# Patient Record
Sex: FEMALE | Race: WHITE | Employment: UNEMPLOYED | URBAN - METROPOLITAN AREA
[De-identification: names, ages, dates, MRNs, and addresses within clinical notes are randomized per-mention and may not be internally consistent; named-entity substitution may affect disease eponyms.]

---
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
## 2021-04-16 ENCOUNTER — HOSPITAL ENCOUNTER (OUTPATIENT)
Dept: ULTRASOUND IMAGING | Age: 21
Discharge: HOME | End: 2021-04-18
Payer: COMMERCIAL

## 2021-04-16 ENCOUNTER — HOSPITAL ENCOUNTER (OUTPATIENT)
Age: 21
Discharge: HOME | End: 2021-04-16
Payer: COMMERCIAL

## 2021-04-16 DIAGNOSIS — N92.1 METRORRHAGIA: ICD-10-CM

## 2021-04-16 LAB
ABSOLUTE EOS #: 0.2 K/UL (ref 0–0.4)
ABSOLUTE IMMATURE GRANULOCYTE: ABNORMAL K/UL (ref 0–0.3)
ABSOLUTE LYMPH #: 2.9 K/UL (ref 1.2–5.2)
ABSOLUTE MONO #: 0.6 K/UL (ref 0.1–1.3)
BASOPHILS # BLD: 0 % (ref 0–2)
BASOPHILS ABSOLUTE: 0 K/UL (ref 0–0.2)
DIFFERENTIAL TYPE: ABNORMAL
EOSINOPHILS RELATIVE PERCENT: 1 % (ref 0–4)
ESTRADIOL LEVEL: 76 PG/ML (ref 27–314)
FOLLICLE STIMULATING HORMONE: 3.9 U/L (ref 1.7–21.5)
HCT VFR BLD CALC: 35.5 % (ref 36–46)
HEMOGLOBIN: 11.6 G/DL (ref 12–16)
IMMATURE GRANULOCYTES: ABNORMAL %
IRON SATURATION: 10 % (ref 20–55)
IRON: 35 UG/DL (ref 37–145)
LH: 4.2 U/L (ref 1–95.6)
LYMPHOCYTES # BLD: 26 % (ref 25–45)
MCH RBC QN AUTO: 27.5 PG (ref 26–34)
MCHC RBC AUTO-ENTMCNC: 32.7 G/DL (ref 31–37)
MCV RBC AUTO: 84.2 FL (ref 80–100)
MONOCYTES # BLD: 5 % (ref 2–8)
NRBC AUTOMATED: ABNORMAL PER 100 WBC
PDW BLD-RTO: 14.1 % (ref 11.5–14.9)
PLATELET # BLD: 326 K/UL (ref 150–450)
PLATELET ESTIMATE: ABNORMAL
PMV BLD AUTO: 8.4 FL (ref 6–12)
RBC # BLD: 4.22 M/UL (ref 4–5.2)
RBC # BLD: ABNORMAL 10*6/UL
SEG NEUTROPHILS: 68 % (ref 34–64)
SEGMENTED NEUTROPHILS ABSOLUTE COUNT: 7.3 K/UL (ref 1.3–9.1)
THYROXINE, FREE: 1.05 NG/DL (ref 0.93–1.7)
TOTAL IRON BINDING CAPACITY: 355 UG/DL (ref 250–450)
TSH SERPL DL<=0.05 MIU/L-ACNC: 7.64 MIU/L (ref 0.3–5)
UNSATURATED IRON BINDING CAPACITY: 320 UG/DL (ref 112–347)
WBC # BLD: 10.9 K/UL (ref 4.5–13.5)
WBC # BLD: ABNORMAL 10*3/UL

## 2021-04-16 PROCEDURE — 82670 ASSAY OF TOTAL ESTRADIOL: CPT

## 2021-04-16 PROCEDURE — 83002 ASSAY OF GONADOTROPIN (LH): CPT

## 2021-04-16 PROCEDURE — 84439 ASSAY OF FREE THYROXINE: CPT

## 2021-04-16 PROCEDURE — 83540 ASSAY OF IRON: CPT

## 2021-04-16 PROCEDURE — 76856 US EXAM PELVIC COMPLETE: CPT

## 2021-04-16 PROCEDURE — 85025 COMPLETE CBC W/AUTO DIFF WBC: CPT

## 2021-04-16 PROCEDURE — 83001 ASSAY OF GONADOTROPIN (FSH): CPT

## 2021-04-16 PROCEDURE — 83550 IRON BINDING TEST: CPT

## 2021-04-16 PROCEDURE — 36415 COLL VENOUS BLD VENIPUNCTURE: CPT

## 2021-04-16 PROCEDURE — 84443 ASSAY THYROID STIM HORMONE: CPT

## 2021-09-22 ENCOUNTER — HOSPITAL ENCOUNTER (OUTPATIENT)
Age: 21
Discharge: HOME | End: 2021-09-22
Payer: COMMERCIAL

## 2021-09-22 LAB
ABSOLUTE EOS #: 0.2 K/UL (ref 0–0.4)
ABSOLUTE IMMATURE GRANULOCYTE: NORMAL K/UL (ref 0–0.3)
ABSOLUTE LYMPH #: 3.5 K/UL (ref 1.2–5.2)
ABSOLUTE MONO #: 0.8 K/UL (ref 0.1–1.3)
BASOPHILS # BLD: 1 % (ref 0–2)
BASOPHILS ABSOLUTE: 0.1 K/UL (ref 0–0.2)
DIFFERENTIAL TYPE: NORMAL
EOSINOPHILS RELATIVE PERCENT: 2 % (ref 0–4)
HCT VFR BLD CALC: 37.9 % (ref 36–46)
HEMOGLOBIN: 12.5 G/DL (ref 12–16)
IMMATURE GRANULOCYTES: NORMAL %
IRON SATURATION: 12 % (ref 20–55)
IRON: 42 UG/DL (ref 37–145)
LYMPHOCYTES # BLD: 30 % (ref 25–45)
MCH RBC QN AUTO: 27.4 PG (ref 26–34)
MCHC RBC AUTO-ENTMCNC: 33 G/DL (ref 31–37)
MCV RBC AUTO: 82.9 FL (ref 80–100)
MONOCYTES # BLD: 7 % (ref 2–8)
NRBC AUTOMATED: NORMAL PER 100 WBC
PDW BLD-RTO: 14.5 % (ref 11.5–14.9)
PLATELET # BLD: 358 K/UL (ref 150–450)
PLATELET ESTIMATE: NORMAL
PMV BLD AUTO: 8 FL (ref 6–12)
RBC # BLD: 4.57 M/UL (ref 4–5.2)
RBC # BLD: NORMAL 10*6/UL
SEG NEUTROPHILS: 60 % (ref 34–64)
SEGMENTED NEUTROPHILS ABSOLUTE COUNT: 7.2 K/UL (ref 1.3–9.1)
TOTAL IRON BINDING CAPACITY: 346 UG/DL (ref 250–450)
TSH SERPL DL<=0.05 MIU/L-ACNC: 3.28 MIU/L (ref 0.3–5)
UNSATURATED IRON BINDING CAPACITY: 304 UG/DL (ref 112–347)
WBC # BLD: 11.7 K/UL (ref 4.5–13.5)
WBC # BLD: NORMAL 10*3/UL

## 2021-09-22 PROCEDURE — 83550 IRON BINDING TEST: CPT

## 2021-09-22 PROCEDURE — 84443 ASSAY THYROID STIM HORMONE: CPT

## 2021-09-22 PROCEDURE — 85025 COMPLETE CBC W/AUTO DIFF WBC: CPT

## 2021-09-22 PROCEDURE — 36415 COLL VENOUS BLD VENIPUNCTURE: CPT

## 2021-09-22 PROCEDURE — 83540 ASSAY OF IRON: CPT

## 2022-08-20 ENCOUNTER — HOSPITAL ENCOUNTER (OUTPATIENT)
Age: 22
Discharge: HOME | End: 2022-08-20
Payer: COMMERCIAL

## 2022-08-20 LAB — TSH SERPL DL<=0.05 MIU/L-ACNC: 4.22 UIU/ML (ref 0.3–5)

## 2022-08-20 PROCEDURE — 84443 ASSAY THYROID STIM HORMONE: CPT

## 2022-08-20 PROCEDURE — 36415 COLL VENOUS BLD VENIPUNCTURE: CPT

## 2023-09-15 ENCOUNTER — HOSPITAL ENCOUNTER (OUTPATIENT)
Age: 23
Setting detail: SPECIMEN
Discharge: HOME | End: 2023-09-15

## 2023-09-16 LAB — TSH SERPL DL<=0.05 MIU/L-ACNC: 5.84 UIU/ML (ref 0.3–5)

## 2024-05-20 ENCOUNTER — HOSPITAL ENCOUNTER (EMERGENCY)
Facility: CLINIC | Age: 24
Discharge: HOME | End: 2024-05-20
Attending: EMERGENCY MEDICINE
Payer: COMMERCIAL

## 2024-05-20 VITALS
WEIGHT: 293 LBS | DIASTOLIC BLOOD PRESSURE: 88 MMHG | SYSTOLIC BLOOD PRESSURE: 131 MMHG | TEMPERATURE: 97.9 F | HEIGHT: 64 IN | RESPIRATION RATE: 16 BRPM | BODY MASS INDEX: 50.02 KG/M2 | OXYGEN SATURATION: 98 % | HEART RATE: 78 BPM

## 2024-05-20 DIAGNOSIS — M79.601 RIGHT ARM PAIN: Primary | ICD-10-CM

## 2024-05-20 DIAGNOSIS — T14.8XXA MUSCLE STRAIN: ICD-10-CM

## 2024-05-20 PROCEDURE — 99284 EMERGENCY DEPT VISIT MOD MDM: CPT

## 2024-05-20 PROCEDURE — 96372 THER/PROPH/DIAG INJ SC/IM: CPT

## 2024-05-20 PROCEDURE — 6360000002 HC RX W HCPCS: Performed by: EMERGENCY MEDICINE

## 2024-05-20 RX ORDER — KETOROLAC TROMETHAMINE 15 MG/ML
15 INJECTION, SOLUTION INTRAMUSCULAR; INTRAVENOUS ONCE
Status: COMPLETED | OUTPATIENT
Start: 2024-05-20 | End: 2024-05-20

## 2024-05-20 RX ORDER — NORGESTIMATE AND ETHINYL ESTRADIOL 7DAYSX3 28
1 KIT ORAL DAILY
COMMUNITY
Start: 2023-06-05 | End: 2024-09-19

## 2024-05-20 RX ADMIN — KETOROLAC TROMETHAMINE 15 MG: 15 INJECTION, SOLUTION INTRAMUSCULAR; INTRAVENOUS at 12:12

## 2024-05-20 ASSESSMENT — ENCOUNTER SYMPTOMS
NAUSEA: 0
COUGH: 0
SORE THROAT: 0
ABDOMINAL PAIN: 0
DIARRHEA: 0
BACK PAIN: 0
VOMITING: 0
SHORTNESS OF BREATH: 0
WHEEZING: 0

## 2024-05-20 ASSESSMENT — PAIN SCALES - GENERAL
PAINLEVEL_OUTOF10: 7
PAINLEVEL_OUTOF10: 7

## 2024-05-20 ASSESSMENT — PAIN - FUNCTIONAL ASSESSMENT: PAIN_FUNCTIONAL_ASSESSMENT: 0-10

## 2024-05-20 ASSESSMENT — PAIN DESCRIPTION - ORIENTATION: ORIENTATION: RIGHT

## 2024-05-20 ASSESSMENT — PAIN DESCRIPTION - LOCATION: LOCATION: ARM

## 2024-05-20 NOTE — ED PROVIDER NOTES
[facility]  [address] [facility] [address]  Phone: [phone]    eMERGENCY dEPARTMENT eNCOUnter        Pt Name: Unique Escamilla  MRN: [id number]  Birthdate 2000  Date of evaluation: 5/20/24    CHIEF COMPLAINT     Chief Complaint   Patient presents with    Arm Pain     right       HISTORY OF PRESENT ILLNESS    Unique Escamilla is a 23 y.o. female who presents to the emergency department accompanied by her significant other with right arm pain.  Pay stated began yesterday evening which was relieved completely after she took some Tylenol.  Patient admits she is right-hand dominant was helping pack up her mother's items from a garage sale.  She stated she did a lot of heavy pushing pulling and lifting.  She is otherwise an obese white female that is not well fit for this activity.  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  She has not taken any additional over-the-counter remedies this morning.  She denies any weakness numbness or swelling in the arm.  It hurts with certain lifting movements and pushing on the area.  No bony tenderness.  She denies any fever chills cough congestion chest pain or shortness of breath.  No other injury or trauma.    REVIEW OF SYSTEMS     Review of Systems   Constitutional:  Negative for chills and fever.   HENT:  Negative for congestion, ear pain and sore throat.    Respiratory:  Negative for cough, shortness of breath and wheezing.    Cardiovascular:  Negative for chest pain, palpitations and leg swelling.   Gastrointestinal:  Negative for abdominal pain, diarrhea, nausea and vomiting.   Genitourinary:  Negative for dysuria, flank pain, frequency and hematuria.   Musculoskeletal:  Negative for back pain.   Skin:  Negative for rash.   Neurological:  Negative for dizziness, light-headedness, numbness and headaches.       PAST MEDICAL HISTORY    has a past medical history of ADHD and  I have answered their questions and given discharge instructions.  They voiced understanding of these instructions and did not have any further questions or complaints.               DIAGNOSTIC RESULTS     LABS:  No results found for this visit on 05/20/24.    All other labs were within normal range or not returned as of this dictation.    RADIOLOGY:  No orders to display       I have reviewed the disposition diagnosis with the patient and or their family/guardian.  I have answered their questions and givendischarge instructions.  They voiced understanding of these instructions and did not have any further questions or complaints.    PROCEDURES:  Unless otherwise noted below, none     Procedures    FINAL IMPRESSION      1. Right arm pain    2. Muscle strain          DISPOSITION/PLAN   DISPOSITION Decision To Discharge 05/20/2024 11:59:57 AM      PATIENT REFERRED TO:  Marcus Hastings MD  [address]  [facility] [address] [phone]    Call today        DISCHARGE MEDICATIONS:  Discharge Medication List as of 5/20/2024 12:15 PM             (Please note that portions of this note were completed with a voice recognition program.  Efforts were made to edit the dictations but occasionally words are mis-transcribed.)    Rhea Lezama DO,(electronically signed)  Board Certified Emergency Physician       Rhea Lezama DO  05/25/24 8111

## 2024-05-20 NOTE — DISCHARGE INSTRUCTIONS
Return to the emergency department if symptoms worsen or persist.  Follow-up with your family doctor 1 to 2 days.  Take ibuprofen as needed for pain over the next 3 days.

## 2024-05-20 NOTE — ED TRIAGE NOTES
Pt to ED for right arm pain that started last night. Pt reports she had a shooting pain, then took 1000mg of Tylenol and had relief from pain. Since she woke up this morning, pain is back. No OTC meds today. States pain is intermittent, states pain starts in fingers and ascends. Rates 7/10.

## 2024-11-05 ENCOUNTER — HOSPITAL ENCOUNTER (EMERGENCY)
Facility: CLINIC | Age: 24
Discharge: HOME | End: 2024-11-05
Attending: EMERGENCY MEDICINE
Payer: COMMERCIAL

## 2024-11-05 ENCOUNTER — APPOINTMENT (OUTPATIENT)
Dept: GENERAL RADIOLOGY | Facility: CLINIC | Age: 24
End: 2024-11-05
Attending: EMERGENCY MEDICINE
Payer: COMMERCIAL

## 2024-11-05 VITALS
OXYGEN SATURATION: 98 % | SYSTOLIC BLOOD PRESSURE: 147 MMHG | WEIGHT: 276.4 LBS | BODY MASS INDEX: 47.19 KG/M2 | HEIGHT: 64 IN | HEART RATE: 74 BPM | DIASTOLIC BLOOD PRESSURE: 88 MMHG | RESPIRATION RATE: 18 BRPM | TEMPERATURE: 97.8 F

## 2024-11-05 DIAGNOSIS — R07.89 CHEST WALL PAIN: Primary | ICD-10-CM

## 2024-11-05 LAB
ANION GAP SERPL CALCULATED.3IONS-SCNC: 13 MMOL/L (ref 9–17)
BASOPHILS # BLD: 0.1 K/UL (ref 0–0.2)
BASOPHILS NFR BLD: 1 % (ref 0–2)
BUN SERPL-MCNC: 9 MG/DL (ref 6–20)
CALCIUM SERPL-MCNC: 9.4 MG/DL (ref 8.6–10.4)
CHLORIDE SERPL-SCNC: 104 MMOL/L (ref 98–107)
CO2 SERPL-SCNC: 23 MMOL/L (ref 20–31)
CREAT SERPL-MCNC: 0.6 MG/DL (ref 0.5–0.9)
D DIMER PPP FEU-MCNC: 0.21 UG/ML FEU
EOSINOPHIL # BLD: 0.2 K/UL (ref 0–0.4)
EOSINOPHILS RELATIVE PERCENT: 2 % (ref 1–4)
ERYTHROCYTE [DISTWIDTH] IN BLOOD BY AUTOMATED COUNT: 13.5 % (ref 12.5–15.4)
GFR, ESTIMATED: >90 ML/MIN/1.73M2
GLUCOSE SERPL-MCNC: 88 MG/DL (ref 70–99)
HCG SERPL QL: NEGATIVE
HCT VFR BLD AUTO: 38.4 % (ref 36–46)
HGB BLD-MCNC: 12.8 G/DL (ref 12–16)
LYMPHOCYTES NFR BLD: 2.8 K/UL (ref 1–4.8)
LYMPHOCYTES RELATIVE PERCENT: 30 % (ref 24–44)
MCH RBC QN AUTO: 28.6 PG (ref 26–34)
MCHC RBC AUTO-ENTMCNC: 33.4 G/DL (ref 31–37)
MCV RBC AUTO: 85.7 FL (ref 80–100)
MONOCYTES NFR BLD: 0.5 K/UL (ref 0.1–1.2)
MONOCYTES NFR BLD: 5 % (ref 2–11)
NEUTROPHILS NFR BLD: 62 % (ref 36–66)
NEUTS SEG NFR BLD: 6 K/UL (ref 1.8–7.7)
PLATELET # BLD AUTO: 319 K/UL (ref 140–450)
PMV BLD AUTO: 8.2 FL (ref 6–12)
POTASSIUM SERPL-SCNC: 3.8 MMOL/L (ref 3.7–5.3)
RBC # BLD AUTO: 4.48 M/UL (ref 4–5.2)
SODIUM SERPL-SCNC: 140 MMOL/L (ref 135–144)
WBC OTHER # BLD: 9.6 K/UL (ref 3.5–11)

## 2024-11-05 PROCEDURE — 6360000002 HC RX W HCPCS: Performed by: EMERGENCY MEDICINE

## 2024-11-05 PROCEDURE — 36415 COLL VENOUS BLD VENIPUNCTURE: CPT

## 2024-11-05 PROCEDURE — 84703 CHORIONIC GONADOTROPIN ASSAY: CPT

## 2024-11-05 PROCEDURE — 96372 THER/PROPH/DIAG INJ SC/IM: CPT

## 2024-11-05 PROCEDURE — 71045 X-RAY EXAM CHEST 1 VIEW: CPT

## 2024-11-05 PROCEDURE — 93005 ELECTROCARDIOGRAM TRACING: CPT | Performed by: EMERGENCY MEDICINE

## 2024-11-05 PROCEDURE — 99285 EMERGENCY DEPT VISIT HI MDM: CPT

## 2024-11-05 PROCEDURE — 85379 FIBRIN DEGRADATION QUANT: CPT

## 2024-11-05 PROCEDURE — 85025 COMPLETE CBC W/AUTO DIFF WBC: CPT

## 2024-11-05 PROCEDURE — 80048 BASIC METABOLIC PNL TOTAL CA: CPT

## 2024-11-05 RX ORDER — KETOROLAC TROMETHAMINE 30 MG/ML
30 INJECTION, SOLUTION INTRAMUSCULAR; INTRAVENOUS ONCE
Status: COMPLETED | OUTPATIENT
Start: 2024-11-05 | End: 2024-11-05

## 2024-11-05 RX ADMIN — KETOROLAC TROMETHAMINE 30 MG: 30 INJECTION, SOLUTION INTRAMUSCULAR at 14:52

## 2024-11-05 ASSESSMENT — PAIN DESCRIPTION - DESCRIPTORS: DESCRIPTORS: SHARP

## 2024-11-05 ASSESSMENT — LIFESTYLE VARIABLES
HOW MANY STANDARD DRINKS CONTAINING ALCOHOL DO YOU HAVE ON A TYPICAL DAY: 1 OR 2
HOW OFTEN DO YOU HAVE A DRINK CONTAINING ALCOHOL: MONTHLY OR LESS

## 2024-11-05 ASSESSMENT — PAIN SCALES - GENERAL: PAINLEVEL_OUTOF10: 7

## 2024-11-05 ASSESSMENT — PAIN - FUNCTIONAL ASSESSMENT: PAIN_FUNCTIONAL_ASSESSMENT: 0-10

## 2024-11-05 ASSESSMENT — PAIN DESCRIPTION - LOCATION: LOCATION: STERNUM

## 2024-11-05 ASSESSMENT — HEART SCORE: ECG: NORMAL
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
## 2024-11-06 LAB
EKG ATRIAL RATE: 70 BPM
EKG P AXIS: 18 DEGREES
EKG P-R INTERVAL: 148 MS
EKG Q-T INTERVAL: 420 MS
EKG QRS DURATION: 80 MS
EKG QTC CALCULATION (BAZETT): 453 MS
EKG R AXIS: 71 DEGREES
EKG T AXIS: 38 DEGREES
EKG VENTRICULAR RATE: 70 BPM